# Patient Record
Sex: FEMALE | Race: WHITE | NOT HISPANIC OR LATINO | Employment: UNEMPLOYED | URBAN - METROPOLITAN AREA
[De-identification: names, ages, dates, MRNs, and addresses within clinical notes are randomized per-mention and may not be internally consistent; named-entity substitution may affect disease eponyms.]

---

## 2018-12-16 ENCOUNTER — HOSPITAL ENCOUNTER (EMERGENCY)
Facility: HOSPITAL | Age: 58
Discharge: HOME | End: 2018-12-16
Attending: EMERGENCY MEDICINE | Admitting: EMERGENCY MEDICINE

## 2018-12-16 ENCOUNTER — APPOINTMENT (OUTPATIENT)
Dept: GENERAL RADIOLOGY | Facility: HOSPITAL | Age: 58
End: 2018-12-16

## 2018-12-16 VITALS
TEMPERATURE: 98.2 F | RESPIRATION RATE: 16 BRPM | HEART RATE: 80 BPM | SYSTOLIC BLOOD PRESSURE: 125 MMHG | DIASTOLIC BLOOD PRESSURE: 94 MMHG | OXYGEN SATURATION: 97 % | BODY MASS INDEX: 24.83 KG/M2 | HEIGHT: 65 IN | WEIGHT: 149 LBS

## 2018-12-16 DIAGNOSIS — S22.42XA CLOSED FRACTURE OF MULTIPLE RIBS OF LEFT SIDE, INITIAL ENCOUNTER: Primary | ICD-10-CM
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
## 2018-12-16 PROCEDURE — 71101 X-RAY EXAM UNILAT RIBS/CHEST: CPT

## 2018-12-16 PROCEDURE — 99283 EMERGENCY DEPT VISIT LOW MDM: CPT

## 2018-12-16 RX ORDER — HYDROCODONE BITARTRATE AND ACETAMINOPHEN 5; 325 MG/1; MG/1
1 TABLET ORAL EVERY 6 HOURS PRN
Status: DISCONTINUED | OUTPATIENT
Start: 2018-12-16 | End: 2018-12-16 | Stop reason: HOSPADM

## 2018-12-16 RX ORDER — POLYETHYLENE GLYCOL 3350 17 G/17G
17 POWDER, FOR SOLUTION ORAL DAILY
Qty: 119 G | Refills: 0 | Status: SHIPPED | OUTPATIENT
Start: 2018-12-16 | End: 2018-12-23

## 2018-12-16 RX ORDER — HYDROCODONE BITARTRATE AND ACETAMINOPHEN 5; 325 MG/1; MG/1
1 TABLET ORAL EVERY 6 HOURS PRN
Qty: 12 TABLET | Refills: 0 | Status: SHIPPED | OUTPATIENT
Start: 2018-12-16 | End: 2018-12-19

## 2018-12-16 RX ADMIN — HYDROCODONE BITARTRATE AND ACETAMINOPHEN 1 TABLET: 5; 325 TABLET ORAL at 14:41
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
## 2020-12-25 ENCOUNTER — HOSPITAL ENCOUNTER (EMERGENCY)
Facility: HOSPITAL | Age: 60
Discharge: HOME | End: 2020-12-26
Attending: EMERGENCY MEDICINE | Admitting: EMERGENCY MEDICINE

## 2020-12-25 VITALS
RESPIRATION RATE: 18 BRPM | TEMPERATURE: 97.8 F | OXYGEN SATURATION: 100 % | BODY MASS INDEX: 24.79 KG/M2 | DIASTOLIC BLOOD PRESSURE: 60 MMHG | HEART RATE: 99 BPM | HEIGHT: 65 IN | SYSTOLIC BLOOD PRESSURE: 136 MMHG

## 2020-12-25 DIAGNOSIS — K57.92 ACUTE DIVERTICULITIS: Primary | ICD-10-CM

## 2020-12-25 DIAGNOSIS — G89.29 CHRONIC HIP PAIN, UNSPECIFIED LATERALITY: ICD-10-CM

## 2020-12-25 DIAGNOSIS — M25.559 CHRONIC HIP PAIN, UNSPECIFIED LATERALITY: ICD-10-CM

## 2020-12-25 PROCEDURE — 99283 EMERGENCY DEPT VISIT LOW MDM: CPT

## 2020-12-25 RX ORDER — SODIUM CHLORIDE 0.9 % (FLUSH) 0.9 %
10 SYRINGE (ML) INJECTION AS NEEDED
Status: DISCONTINUED | OUTPATIENT
Start: 2020-12-25 | End: 2020-12-26 | Stop reason: HOSPADM

## 2020-12-26 ENCOUNTER — APPOINTMENT (OUTPATIENT)
Dept: CT IMAGING | Facility: HOSPITAL | Age: 60
End: 2020-12-26

## 2020-12-26 LAB
ALBUMIN SERPL-MCNC: 3.7 G/DL (ref 3.5–5.2)
ALBUMIN/GLOB SERPL: 1.4 G/DL
ALP SERPL-CCNC: 82 U/L (ref 39–117)
ALT SERPL W P-5'-P-CCNC: 10 U/L (ref 1–33)
ANION GAP SERPL CALCULATED.3IONS-SCNC: 7.1 MMOL/L (ref 5–15)
AST SERPL-CCNC: 16 U/L (ref 1–32)
BASOPHILS # BLD AUTO: 0.05 10*3/MM3 (ref 0–0.2)
BASOPHILS NFR BLD AUTO: 0.4 % (ref 0–1.5)
BILIRUB SERPL-MCNC: 0.2 MG/DL (ref 0–1.2)
BUN SERPL-MCNC: 11 MG/DL (ref 6–20)
BUN/CREAT SERPL: 18.6 (ref 7–25)
CALCIUM SPEC-SCNC: 8.8 MG/DL (ref 8.6–10.5)
CHLORIDE SERPL-SCNC: 107 MMOL/L (ref 98–107)
CO2 SERPL-SCNC: 25.9 MMOL/L (ref 22–29)
CREAT SERPL-MCNC: 0.59 MG/DL (ref 0.57–1)
DEPRECATED RDW RBC AUTO: 42.7 FL (ref 37–54)
EOSINOPHIL # BLD AUTO: 0.7 10*3/MM3 (ref 0–0.4)
EOSINOPHIL NFR BLD AUTO: 5.4 % (ref 0.3–6.2)
ERYTHROCYTE [DISTWIDTH] IN BLOOD BY AUTOMATED COUNT: 13 % (ref 12.3–15.4)
GFR SERPL CREATININE-BSD FRML MDRD: 104 ML/MIN/1.73
GLOBULIN UR ELPH-MCNC: 2.6 GM/DL
GLUCOSE SERPL-MCNC: 104 MG/DL (ref 65–99)
HCT VFR BLD AUTO: 40.4 % (ref 34–46.6)
HGB BLD-MCNC: 13.5 G/DL (ref 12–15.9)
IMM GRANULOCYTES # BLD AUTO: 0.08 10*3/MM3 (ref 0–0.05)
IMM GRANULOCYTES NFR BLD AUTO: 0.6 % (ref 0–0.5)
LIPASE SERPL-CCNC: 30 U/L (ref 13–60)
LYMPHOCYTES # BLD AUTO: 2.17 10*3/MM3 (ref 0.7–3.1)
LYMPHOCYTES NFR BLD AUTO: 16.6 % (ref 19.6–45.3)
MCH RBC QN AUTO: 29.9 PG (ref 26.6–33)
MCHC RBC AUTO-ENTMCNC: 33.4 G/DL (ref 31.5–35.7)
MCV RBC AUTO: 89.4 FL (ref 79–97)
MONOCYTES # BLD AUTO: 0.96 10*3/MM3 (ref 0.1–0.9)
MONOCYTES NFR BLD AUTO: 7.4 % (ref 5–12)
NEUTROPHILS NFR BLD AUTO: 69.6 % (ref 42.7–76)
NEUTROPHILS NFR BLD AUTO: 9.08 10*3/MM3 (ref 1.7–7)
NRBC BLD AUTO-RTO: 0 /100 WBC (ref 0–0.2)
PLATELET # BLD AUTO: 267 10*3/MM3 (ref 140–450)
PMV BLD AUTO: 12.1 FL (ref 6–12)
POTASSIUM SERPL-SCNC: 3.7 MMOL/L (ref 3.5–5.2)
PROT SERPL-MCNC: 6.3 G/DL (ref 6–8.5)
RBC # BLD AUTO: 4.52 10*6/MM3 (ref 3.77–5.28)
SODIUM SERPL-SCNC: 140 MMOL/L (ref 136–145)
WBC # BLD AUTO: 13.04 10*3/MM3 (ref 3.4–10.8)

## 2020-12-26 PROCEDURE — 25010000002 IOPAMIDOL 61 % SOLUTION: Performed by: EMERGENCY MEDICINE

## 2020-12-26 PROCEDURE — 74177 CT ABD & PELVIS W/CONTRAST: CPT

## 2020-12-26 PROCEDURE — 83690 ASSAY OF LIPASE: CPT | Performed by: EMERGENCY MEDICINE

## 2020-12-26 PROCEDURE — 80053 COMPREHEN METABOLIC PANEL: CPT | Performed by: EMERGENCY MEDICINE

## 2020-12-26 PROCEDURE — 85025 COMPLETE CBC W/AUTO DIFF WBC: CPT | Performed by: EMERGENCY MEDICINE

## 2020-12-26 RX ORDER — AMOXICILLIN AND CLAVULANATE POTASSIUM 875; 125 MG/1; MG/1
1 TABLET, FILM COATED ORAL 2 TIMES DAILY
Qty: 14 TABLET | Refills: 0 | Status: SHIPPED | OUTPATIENT
Start: 2020-12-26

## 2020-12-26 RX ADMIN — IOPAMIDOL 85 ML: 612 INJECTION, SOLUTION INTRAVENOUS at 00:53

## 2020-12-26 NOTE — ED PROVIDER NOTES
EMERGENCY DEPARTMENT ENCOUNTER    CHIEF COMPLAINT  Chief Complaint: Abdominal pain/chronic hip pain  History given by: Patient  History limited by: None  Room Number: 12/12  PMD: Provider, No Known      HPI:  Pt is a 60 y.o. female who presents complaining of ongoing intermittent abdominal discomfort that was gradual in onset.  The patient states that symptoms have been present for months but had been improved with a light diet.  The patient states that she ate today and had recurrence of crampy abdominal discomfort isolated to the midportion of her abdomen.  Symptoms are nonradiating.  It appears as though she was diagnosed with acute diverticulitis with an abscess formation back in October.  She was seen by surgery and was  treated nonsurgically and with IV and p.o. antibiotics following.  The patient also complains of ongoing chronic right hip discomfort  Without any changes or without any acute abnormalities or complaints there.  She describes the abdominal discomfort as a crampy sensation that is nonradiating.  She denies any associated fever/chills, nausea/vomiting, diarrhea/constipation, or any known sick contacts.  There are no aggravating or alleviating factors and there is been no treatment rendered prior to arrival.  Symptoms are currently mild in intensity.        PAST MEDICAL HISTORY  Active Ambulatory Problems     Diagnosis Date Noted   • No Active Ambulatory Problems     Resolved Ambulatory Problems     Diagnosis Date Noted   • No Resolved Ambulatory Problems     No Additional Past Medical History       PAST SURGICAL HISTORY  No past surgical history on file.    FAMILY HISTORY  No family history on file.    SOCIAL HISTORY  Social History     Socioeconomic History   • Marital status: Single     Spouse name: Not on file   • Number of children: Not on file   • Years of education: Not on file   • Highest education level: Not on file       ALLERGIES  Aspartame, Codeine, and Prevacid  [lansoprazole]    REVIEW OF SYSTEMS  Review of Systems   Constitutional: Negative for fever.   HENT: Negative for sore throat.    Eyes: Negative.    Respiratory: Negative for cough and shortness of breath.    Cardiovascular: Negative for chest pain.   Gastrointestinal: Positive for abdominal pain. Negative for diarrhea and vomiting.   Genitourinary: Negative for dysuria.   Musculoskeletal: Positive for arthralgias. Negative for neck pain.        Hip pain   Skin: Negative for rash.   Allergic/Immunologic: Negative.    Neurological: Negative for weakness, numbness and headaches.   Hematological: Negative.    Psychiatric/Behavioral: Negative.    All other systems reviewed and are negative.      PHYSICAL EXAM  ED Triage Vitals   Temp Heart Rate Resp BP SpO2   12/25/20 2302 12/25/20 2302 12/25/20 2302 12/25/20 2305 12/25/20 2302   97.8 °F (36.6 °C) 99 18 156/84 100 %      Temp src Heart Rate Source Patient Position BP Location FiO2 (%)   12/25/20 2302 12/25/20 2302 -- -- --   Tympanic Monitor          Physical Exam   Constitutional: She is oriented to person, place, and time. No distress.   HENT:   Head: Normocephalic and atraumatic.   Eyes: Pupils are equal, round, and reactive to light. EOM are normal.   Neck: Normal range of motion. Neck supple.   Cardiovascular: Normal rate, regular rhythm and normal heart sounds.   Pulmonary/Chest: Effort normal and breath sounds normal. No respiratory distress.   Abdominal: Soft. There is no abdominal tenderness. There is no rebound and no guarding.   Musculoskeletal: Normal range of motion.         General: No edema.   Neurological: She is alert and oriented to person, place, and time. She has normal sensation and normal strength.   Skin: Skin is warm and dry. No rash noted.   Psychiatric: Mood and affect normal.   Nursing note and vitals reviewed.      LAB RESULTS  Lab Results (last 24 hours)     Procedure Component Value Units Date/Time    CBC & Differential [[id number]]   (Abnormal) Collected: 12/26/20 0008    Specimen: Blood Updated: 12/26/20 0024    Narrative:      The following orders were created for panel order CBC & Differential.  Procedure                               Abnormality         Status                     ---------                               -----------         ------                     CBC Auto Differential[[id number]]        Abnormal            Final result                 Please view results for these tests on the individual orders.    Comprehensive Metabolic Panel [[id number]]  (Abnormal) Collected: 12/26/20 0008    Specimen: Blood Updated: 12/26/20 0038     Glucose 104 mg/dL      BUN 11 mg/dL      Creatinine 0.59 mg/dL      Sodium 140 mmol/L      Potassium 3.7 mmol/L      Chloride 107 mmol/L      CO2 25.9 mmol/L      Calcium 8.8 mg/dL      Total Protein 6.3 g/dL      Albumin 3.70 g/dL      ALT (SGPT) 10 U/L      AST (SGOT) 16 U/L      Alkaline Phosphatase 82 U/L      Total Bilirubin 0.2 mg/dL      eGFR Non African Amer 104 mL/min/1.73      Globulin 2.6 gm/dL      A/G Ratio 1.4 g/dL      BUN/Creatinine Ratio 18.6     Anion Gap 7.1 mmol/L     Narrative:      GFR Normal >60  Chronic Kidney Disease <60  Kidney Failure <15      Lipase [[id number]]  (Normal) Collected: 12/26/20 0008    Specimen: Blood Updated: 12/26/20 0038     Lipase 30 U/L     CBC Auto Differential [[id number]]  (Abnormal) Collected: 12/26/20 0008    Specimen: Blood Updated: 12/26/20 0024     WBC 13.04 10*3/mm3      RBC 4.52 10*6/mm3      Hemoglobin 13.5 g/dL      Hematocrit 40.4 %      MCV 89.4 fL      MCH 29.9 pg      MCHC 33.4 g/dL      RDW 13.0 %      RDW-SD 42.7 fl      MPV 12.1 fL      Platelets 267 10*3/mm3      Neutrophil % 69.6 %      Lymphocyte % 16.6 %      Monocyte % 7.4 %      Eosinophil % 5.4 %      Basophil % 0.4 %      Immature Grans % 0.6 %      Neutrophils, Absolute 9.08 10*3/mm3      Lymphocytes, Absolute 2.17 10*3/mm3      Monocytes, Absolute 0.96 10*3/mm3      Eosinophils,  "Absolute 0.70 10*3/mm3      Basophils, Absolute 0.05 10*3/mm3      Immature Grans, Absolute 0.08 10*3/mm3      nRBC 0.0 /100 WBC           I ordered the above labs and reviewed the results    RADIOLOGY  CT Abdomen Pelvis With Contrast   Final Result       1. Diverticulitis involving the proximal sigmoid colon. No extraluminal   gas or fluid collection is seen to suggest perforation with abscess   formation.   2. 2 areas of nodularity identified at the left lung base. These may   represent areas of scarring, but short-term CT follow-up in 3 months is   recommended.       Radiation dose reduction techniques were utilized, including automated   exposure control and exposure modulation based on body size.       This report was finalized on 12/26/2020 1:20 AM by Dr. Madina Hammonds M.D.               I ordered the above noted radiological studies. Interpreted by radiologist.  Reviewed by me in PACS.       PROCEDURES  Procedures      PROGRESS AND CONSULTS     The patient was wearing a facemask upon entrance into the room and remained in such throughout their visit.  I was wearing PPE including a facemask, eye protection, as well as gloves at any point entering the room and throughout the visit    0130  Upon reevaluation, the patient is currently asleep and upon awakening I did inform her that her laboratory results are unremarkable however she does have acute sigmoid diverticulitis seen on CAT scan.  When compared to the CAT scan from October it has significantly improved as she no longer has an abscess, the patient at this point became highly upset and is asking about her chronic hip pain and how she needs an injection in this hip.  I did inform her that in the emergency room, we do not do intra-articular pain injections that she would have to follow-up with her doctor regarding that.  The patient once again became upset and stated \"just give me by diagnosis and discharge me\".  At this point I told her she has " chronic hip pain with acute diverticulitis that would be amenable to oral antibiotics.  She is currently stable for discharge.      MEDICAL DECISION MAKING  Results were reviewed/discussed with the patient and they were also made aware of online access. Pt also made aware that some labs, such as cultures, will not be resulted during ER visit and follow up with PMD is necessary.     MDM  Number of Diagnoses or Management Options     Amount and/or Complexity of Data Reviewed  Clinical lab tests: ordered and reviewed  Tests in the radiology section of CPT®: reviewed and ordered  Review and summarize past medical records: yes (Upon medical records review, the patient was last seen and evaluated on October 27, 2020 secondary to acute diverticulitis.)  Independent visualization of images, tracings, or specimens: yes (CT scan with acute sigmoid diverticulitis without perforation or abscess)           DIAGNOSIS  Final diagnoses:   Acute diverticulitis   Chronic hip pain, unspecified laterality       DISPOSITION  DISCHARGE    Patient discharged in stable condition.    Reviewed implications of results, diagnosis, meds, responsibility to follow up, warning signs and symptoms of possible worsening, potential complications and reasons to return to ER.    Patient/Family voiced understanding of above instructions.    Discussed plan for discharge, as there is no emergent indication for admission. Patient referred to primary care provider for BP management due to today's BP. Pt/family is agreeable and understands need for follow up and repeat testing.  Pt is aware that discharge does not mean that nothing is wrong but it indicates no emergency is present that requires admission and they must continue care with follow-up as given below or physician of their choice.     FOLLOW-UP  [facility]  [facility] [id number] [phone]  Schedule an appointment as soon as possible for a visit             Medication List      New Prescriptions    amoxicillin-clavulanate 875-125 MG per tablet  Commonly known as: AUGMENTIN  Take 1 tablet by mouth 2 (Two) Times a Day.           Where to Get Your Medications      You can get these medications from any pharmacy    Bring a paper prescription for each of these medications  · amoxicillin-clavulanate 875-125 MG per tablet           Latest Documented Vital Signs:  As of 06:35 EST  BP- 136/60 HR- 99 Temp- 97.8 °F (36.6 °C) (Tympanic) O2 sat- 100%         Jomar Medina MD  12/26/20 8912

## 2020-12-26 NOTE — ED NOTES
"Pt to ER via POV from home, placed in mask, staff in PPE. Pt reports chronic right hip pain states \"I need surgery and I get 3 shots in my hip. I also want to have my white blood count checked.\" Pt denies any acute injury at this time. Pt is able to ambulate at triage without difficulty.     Sarah Guzman, RN  12/25/20 5955    "

## 2020-12-26 NOTE — ED NOTES
Pt left without VS checked prior to DC. Pt left without paperwork or prescriptions.     Yolette Kowalski, LEON  12/26/20 0146
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
## 2024-03-07 ENCOUNTER — APPOINTMENT (OUTPATIENT)
Dept: GENERAL RADIOLOGY | Facility: HOSPITAL | Age: 64
End: 2024-03-07
Payer: MEDICARE

## 2024-03-07 ENCOUNTER — APPOINTMENT (OUTPATIENT)
Dept: CT IMAGING | Facility: HOSPITAL | Age: 64
End: 2024-03-07
Payer: MEDICARE

## 2024-03-07 ENCOUNTER — HOSPITAL ENCOUNTER (EMERGENCY)
Facility: HOSPITAL | Age: 64
Discharge: HOME | End: 2024-03-08
Attending: EMERGENCY MEDICINE
Payer: MEDICARE

## 2024-03-07 DIAGNOSIS — S39.012A STRAIN OF LUMBAR REGION, INITIAL ENCOUNTER: ICD-10-CM

## 2024-03-07 DIAGNOSIS — S50.02XA CONTUSION OF LEFT ELBOW, INITIAL ENCOUNTER: Primary | ICD-10-CM

## 2024-03-07 DIAGNOSIS — M16.11 OSTEOARTHRITIS OF RIGHT HIP, UNSPECIFIED OSTEOARTHRITIS TYPE: ICD-10-CM

## 2024-03-07 PROCEDURE — 73070 X-RAY EXAM OF ELBOW: CPT

## 2024-03-07 PROCEDURE — 72110 X-RAY EXAM L-2 SPINE 4/>VWS: CPT

## 2024-03-07 PROCEDURE — 70450 CT HEAD/BRAIN W/O DYE: CPT

## 2024-03-07 PROCEDURE — 99284 EMERGENCY DEPT VISIT MOD MDM: CPT

## 2024-03-07 PROCEDURE — 73502 X-RAY EXAM HIP UNI 2-3 VIEWS: CPT

## 2024-03-08 VITALS
SYSTOLIC BLOOD PRESSURE: 153 MMHG | RESPIRATION RATE: 18 BRPM | DIASTOLIC BLOOD PRESSURE: 88 MMHG | OXYGEN SATURATION: 100 % | WEIGHT: 165 LBS | BODY MASS INDEX: 26.52 KG/M2 | HEART RATE: 86 BPM | TEMPERATURE: 98 F | HEIGHT: 66 IN

## 2024-03-08 RX ORDER — IBUPROFEN 800 MG/1
800 TABLET ORAL EVERY 8 HOURS PRN
Qty: 30 TABLET | Refills: 0 | Status: SHIPPED | OUTPATIENT
Start: 2024-03-08

## 2024-03-08 RX ORDER — LIDOCAINE 50 MG/G
1 PATCH TOPICAL EVERY 24 HOURS
Qty: 15 EACH | Refills: 0 | Status: SHIPPED | OUTPATIENT
Start: 2024-03-08

## 2024-03-08 NOTE — ED PROVIDER NOTES
" EMERGENCY DEPARTMENT ENCOUNTER    Room Number:  21/21  PCP: Provider, No Known  Independent Historians: Patient    HPI:  Chief Complaint: had concerns including Fall.      A complete HPI/ROS/PMH/PSH/SH/FH are unobtainable due to: None    Chronic or social conditions impacting patient care (Social Determinants of Health): None      Context: Sukh House is a 64 y.o. female with a medical history of right hip chronic issues who presents to the ED c/o acute fall 2 days ago.  Patient was walking down some stairs when she fell down 6 of those stairs landing on her back.  She also hit her left elbow and the back of her head.  She denies being on any anticoagulation.  She has had continued low back and right hip pain since the fall as well as left elbow pain with some swelling.  She had headache the day after along with an episode of brief blurred vision that is all resolved at this time.  At the time of the call she denies any loss of consciousness or being dazed or having any nausea or vomiting but does report the headache developed next day.  She denies any neck pain, rib pain, chest pain, shortness of breath, or abdominal pain.    Patient mentions history of right hip problems severe enough that she was scheduled for right hip replacement.  She states it was canceled due to \"COVID\".  Since COVID she has not rescheduled any right hip procedure.        Review of prior external notes (non-ED) -and- Review of prior external test results outside of this encounter: Patient with history of back and right hip problems.  Patient had a lumbar spine MRI in June 2020 with some increased lumbar lordosis and facet arthropathy at L5-S1 but no right-sided nerve compression.    Prescription drug monitoring program review:     N/A    PAST MEDICAL HISTORY  Active Ambulatory Problems     Diagnosis Date Noted    No Active Ambulatory Problems     Resolved Ambulatory Problems     Diagnosis Date Noted    No Resolved Ambulatory Problems     No " Additional Past Medical History         PAST SURGICAL HISTORY  No past surgical history on file.      FAMILY HISTORY  No family history on file.      SOCIAL HISTORY  Social History     Socioeconomic History    Marital status: Single         ALLERGIES  Aspartame, Codeine, and Prevacid [lansoprazole]        REVIEW OF SYSTEMS  Review of Systems  Included in HPI  All systems reviewed and negative except for those discussed in HPI.      PHYSICAL EXAM    I have reviewed the triage vital signs and nursing notes.    ED Triage Vitals [03/07/24 2242]   Temp Heart Rate Resp BP SpO2   98 °F (36.7 °C) 94 20 -- 100 %      Temp src Heart Rate Source Patient Position BP Location FiO2 (%)   -- -- -- -- --       Physical Exam  GENERAL: Pleasant cooperative and conversant female, alert, no acute distress  SKIN: Warm, dry  HENT: Normocephalic, atraumatic  EYES: no scleral icterus, EOMI, no conjunctivitis  CV: regular rhythm, regular rate  RESPIRATORY: normal effort, lungs clear, no wheezing, no rhonchi  ABDOMEN: soft, nontender, nondistended, no rebound, no guarding  Back: Diffuse lumbar spinous tenderness to palpation with no point tenderness, soft tissue tenderness right paraspinal area and into right posterior hip on exam  MUSCULOSKELETAL: no deformity, left lateral elbow soft tissue swelling consistent with hematoma, no bony deformity, full range of motion including full flexion and extension of the left elbow, 2+ radial pulse left wrist  NEURO: alert, moves all extremities, follows commands                                                                 RADIOLOGY  XR Hip With or Without Pelvis 2 - 3 View Right    Result Date: 3/8/2024  AP VIEW THE PELVIS +2 VIEWS RIGHT HIP  HISTORY: Right hip pain  COMPARISON: December 26, 2020  FINDINGS: No acute fracture or subluxation of the bony pelvis or right hip is seen. The patient has advanced asymmetric degenerative changes involving the right hip. This is associated with severe joint  space narrowing, sclerosis, and osteophyte formation. There is also subchondral cystlike change. There is some deformity of the right femoral head, and I think appearance is suspicious for avascular necrosis. The patient had similar findings in December 2020, but they appear to have progressed.      No acute fracture or subluxation identified.  This report was finalized on 3/8/2024 12:20 AM by Dr. Madina Hammonds M.D on Workstation: [id number]      XR ELBOW 2 VIEW LEFT    Result Date: 3/8/2024  2 VIEWS LEFT ELBOW  HISTORY: Left elbow pain  COMPARISON: None available.  FINDINGS: No acute fracture or subluxation of the left elbow is seen. There is no elbow effusion. There is some degenerative change involving the lateral epicondyles.      No acute fracture or subluxation identified.  This report was finalized on 3/8/2024 12:12 AM by Dr. Madina Hammonds M.D on Workstation: [id number]      CT Head Without Contrast    Result Date: 3/8/2024  CT OF THE HEAD WITHOUT CONTRAST  HISTORY: Head trauma  COMPARISON: None available.  TECHNIQUE: Axial CT imaging was obtained through the brain. No IV contrast was administered.  FINDINGS: No acute intracranial hemorrhage is seen. Ventricles are normal in size. There is no midline shift or mass effect. No calvarial fracture is seen. There is mucosal thickening within the ethmoid sinuses. Mastoid air cells appear clear.      No acute intracranial findings.  Radiation dose reduction techniques were utilized, including automated exposure control and exposure modulation based on body size.   This report was finalized on 3/8/2024 12:12 AM by Dr. Madina Hammonds M.D on Workstation: [id number]      XR Spine Lumbar Complete 4+VW    Result Date: 3/8/2024  4 VIEWS LUMBAR SPINE  HISTORY: Low back pain  COMPARISON: 7/26/2020  FINDINGS: No acute fracture or subluxation of the lumbar spine is seen. Lumbar vertebral body alignment appears within normal limits. There is some mild  intervertebral disc space narrowing noted at L3-L4, L4-L5, and L5-S1. This may have progressed slightly when compared to prior study.      No acute fracture or subluxation identified.  This report was finalized on 3/8/2024 12:08 AM by Dr. Madina Hammonds M.D on Workstation: [id number]         MEDICATIONS GIVEN IN ER  Medications - No data to display      ORDERS PLACED DURING THIS VISIT:  Orders Placed This Encounter   Procedures    CT Head Without Contrast    XR ELBOW 2 VIEW LEFT    XR Spine Lumbar Complete 4+VW    XR Hip With or Without Pelvis 2 - 3 View Right         OUTPATIENT MEDICATION MANAGEMENT:  No current Epic-ordered facility-administered medications on file.     Current Outpatient Medications Ordered in Epic   Medication Sig Dispense Refill    ibuprofen (ADVIL,MOTRIN) 800 MG tablet Take 1 tablet by mouth Every 8 (Eight) Hours As Needed for Moderate Pain. 30 tablet 0    lidocaine (LIDODERM) 5 % Place 1 patch on the skin as directed by provider Daily. Remove & Discard patch within 12 hours or as directed by MD 15 each 0         PROCEDURES  Procedures            PROGRESS, DATA ANALYSIS, CONSULTS, AND MEDICAL DECISION MAKING  All labs have been independently interpreted by me.  All radiology studies have been reviewed by me. All EKG's have been independently viewed and interpreted by me.  Discussion below represents my analysis of pertinent findings related to patient's condition, differential diagnosis, treatment plan and final disposition.    Differential diagnosis includes but is not limited to   Patient presenting after a fall 2 days ago that sounds a good pretty significant fall down 6 steps.  Patient however did not have any loss of consciousness but did strike her head.  Patient had headache next day and some visual disturbance that was brief.  She has not had any since then but because of ongoing left elbow pain and low back pain she decided to come in for evaluation.  Possibilities include lumbar  fracture, elbow fracture, lumbar strain, soft tissue elbow contusion or hematoma, intracranial hemorrhage, skull fracture, closed head injury concussion, among other possibilities.  Plan to image patient's right hip given her chronic issues there and pain that radiates to right posterior hip from her low back.  Also plan for x-ray of patient's lumbar spine.  Will obtain x-ray left elbow.  Will also CT patient's head given her next-day headache and visual symptoms.  Patient is amenable to plan.    Clinical Scores:                             AS OF 02:13 EST VITALS:    BP - 153/88  HR - 86  TEMP - 98 °F (36.7 °C)  O2 SATS - 100%      COMPLEXITY OF CARE  Admission was considered but after careful review of the patient's presentation, physical examination, diagnostic results, and response to treatment the patient may be safely discharged with outpatient follow-up.    DIAGNOSIS  Final diagnoses:   Contusion of left elbow, initial encounter   Strain of lumbar region, initial encounter   Osteoarthritis of right hip, unspecified osteoarthritis type         DISPOSITION  ED Disposition       ED Disposition   Discharge    Condition   Stable    Comment   --                Please note that portions of this document were completed with a voice recognition program.    Note Disclaimer: At [facility], we believe that sharing information builds trust and better relationships. You are receiving this note because you recently visited [facility]. It is possible you will see health information before a provider has talked with you about it. This kind of information can be easy to misunderstand. To help you fully understand what it means for your health, we urge you to discuss this note with your provider.         Nicole Pardo MD  03/09/24 0147       Nicole Pardo MD  03/09/24 0147

## 2024-03-08 NOTE — DISCHARGE INSTRUCTIONS
Rest at home avoiding any particularly strenuous activity today and tomorrow.     Eat small, frequent meals and drink plenty of fluids. Take any medication prescribed as instructed.     Monitor for any signs of recurrent symptoms or worsening and see your primary doctor to discuss your ER visit while returning to the ER if any concerns as we discussed.